# Patient Record
Sex: MALE | Race: WHITE | NOT HISPANIC OR LATINO | Employment: OTHER | URBAN - METROPOLITAN AREA
[De-identification: names, ages, dates, MRNs, and addresses within clinical notes are randomized per-mention and may not be internally consistent; named-entity substitution may affect disease eponyms.]

---

## 2017-08-01 ENCOUNTER — ALLSCRIPTS OFFICE VISIT (OUTPATIENT)
Dept: OTHER | Facility: OTHER | Age: 67
End: 2017-08-01

## 2017-08-21 ENCOUNTER — GENERIC CONVERSION - ENCOUNTER (OUTPATIENT)
Dept: OTHER | Facility: OTHER | Age: 67
End: 2017-08-21

## 2018-02-20 DIAGNOSIS — L20.9 ATOPIC DERMATITIS AND RELATED CONDITION: Primary | ICD-10-CM

## 2018-05-18 DIAGNOSIS — L20.9 ATOPIC DERMATITIS AND RELATED CONDITION: ICD-10-CM

## 2021-06-25 ENCOUNTER — TELEPHONE (OUTPATIENT)
Dept: GASTROENTEROLOGY | Facility: CLINIC | Age: 71
End: 2021-06-25

## 2021-06-25 NOTE — TELEPHONE ENCOUNTER
Aby Dueñas-  Patient will a new patient  Noemí Persaud ) maybe? He has gerd and will probably need an EGD  Noemí Persaud He is very concerned about the anesthesia that will be used for the EGD  Noemí Persaud He chose not to schedule an office appt until his questions are answered     Please phone [phone]

## 2021-06-28 NOTE — TELEPHONE ENCOUNTER
Called pt to see how we can help  Pt stated he wants twilight and to be able to drive home himself   Advised pt yes he will have twilght but due to liability issues, the hospital policy is that he would have to have a  to get him home  Suggested pt to get an uber  Pt stated he is on a fixed income and cannot do an uber  Told pt will inquire about hospital service of getting an uber  Pt said he does not want to bother the hospital about that and would feel wrong  Pt said he wanted his procedure at [facility] because every time he went to [place] something happened  Pt again stated he is not concerned about having an IV but he gets terrible nightmares from anesthesia thom the propofol  Pt again stated he wants something that he can wake up and drive home  Again advised pt of [employer] and the concern of the pt driving home  Pt said then he will die from stomach cancer  Pt also said it seems like he will contact a  because the hospital is trying to control his life     Routing to pa  Please advise

## 2021-06-28 NOTE — TELEPHONE ENCOUNTER
You are correct in what you said Carie Ramos  Unfortunately this is something we have no control over and it is a legal issue where the patient cannot drive home after any type of anesthesia, whether it is twilight or not  This will be the same policy no matter what hospital or surgery center he goes to  If he has any further concerns, I would direct him to our [address] [place] [address] Avenue

## 2022-08-09 DIAGNOSIS — Z98.84 STATUS POST BARIATRIC SURGERY: Primary | ICD-10-CM

## 2022-08-24 ENCOUNTER — HOSPITAL ENCOUNTER (OUTPATIENT)
Dept: RADIOLOGY | Facility: HOSPITAL | Age: 72
Discharge: HOME | End: 2022-08-24
Attending: SURGERY
Payer: MEDICARE

## 2022-08-24 DIAGNOSIS — Z98.84 STATUS POST BARIATRIC SURGERY: ICD-10-CM

## 2022-08-24 PROCEDURE — 74240 X-RAY XM UPR GI TRC 1CNTRST: CPT

## 2022-09-26 PROBLEM — K91.2 POSTSURGICAL MALABSORPTION: Status: ACTIVE | Noted: 2022-09-26

## 2022-09-26 PROBLEM — E11.65 TYPE 2 DIABETES MELLITUS WITH HYPERGLYCEMIA, WITH LONG-TERM CURRENT USE OF INSULIN (HCC): Status: ACTIVE | Noted: 2020-11-18

## 2022-09-26 PROBLEM — G47.30 SLEEP APNEA: Status: ACTIVE | Noted: 2017-11-09

## 2022-09-26 PROBLEM — Z79.4 TYPE 2 DIABETES MELLITUS WITH HYPERGLYCEMIA, WITH LONG-TERM CURRENT USE OF INSULIN (HCC): Status: ACTIVE | Noted: 2020-11-18

## 2022-09-26 PROBLEM — I10 HYPERTENSION, ESSENTIAL: Status: ACTIVE | Noted: 2017-11-09

## 2022-09-26 PROBLEM — I82.511 CHRONIC DEEP VEIN THROMBOSIS (DVT) OF FEMORAL VEIN OF RIGHT LOWER EXTREMITY (HCC): Status: ACTIVE | Noted: 2020-07-06

## 2022-09-26 PROBLEM — Z48.815 ENCOUNTER FOR SURGICAL AFTERCARE FOLLOWING SURGERY OF DIGESTIVE SYSTEM: Status: ACTIVE | Noted: 2022-09-26

## 2022-09-26 PROBLEM — E78.2 MIXED HYPERLIPIDEMIA: Status: ACTIVE | Noted: 2022-02-28

## 2022-09-26 NOTE — ASSESSMENT & PLAN NOTE
-At risk for malabsorption of vitamins/minerals secondary to malabsorption and restriction of intake from bariatric surgery  -NOT Currently taking adequate postop bariatric surgery vitamin supplementation: MVI, vitamin C, B12, fish oil  -Recommend start Noah MVI and calcium citrate 12-1500mg daily - to review further with RD    -Obtain CBC/Metabolic panel  -Patient received education about the importance of adhering to a lifelong supplementation regimen to avoid vitamin/mineral deficiencies

## 2022-09-26 NOTE — ASSESSMENT & PLAN NOTE
-s/p Vertical Sleeve Gastrectomy with Dr Jarred Masters in 2018  Struggling with suboptimal weight loss s/p surgery and weight regain  He does not wish to pursue revision at this time and will be referred to surgical RD and consult with MWM  He declines LCSW  He will practice 30/60 rule, avoid juice, increase protein, and avoid grazing/snacking  Recommend he eat slower, chew thoroughly, avoid drinking with meals, increase protein to avoid foaming spit up  Recommend support groups, PEP rallies  EGD to assess his esophagus and r/o Combs's  Initial: 300lbs  Current: 283 4lbs  Jett: 243lbs   Current BMI is Body mass index is 47 68 kg/m²  · Tolerating a regular diet-yes  · Eating at least 60 grams of protein per day-not always; advised he needs to increase   · Following 30/60 minute rule with liquids-yes  · Drinking at least 64 ounces of fluid per day-yes  · Drinking carbonated beverages-no  · Sufficient exercise-no d/t arthritis pain  · Using NSAIDs regularly-no  · Using nicotine-no  · Using alcohol-rare   Advised about the risks of alcohol s/p bariatric surgery and recommend avoiding all alcohol

## 2022-09-28 ENCOUNTER — OFFICE VISIT (OUTPATIENT)
Dept: BARIATRICS | Facility: CLINIC | Age: 72
End: 2022-09-28
Payer: MEDICARE

## 2022-09-28 ENCOUNTER — TELEPHONE (OUTPATIENT)
Dept: BARIATRICS | Facility: CLINIC | Age: 72
End: 2022-09-28

## 2022-09-28 VITALS
RESPIRATION RATE: 18 BRPM | WEIGHT: 283.4 LBS | BODY MASS INDEX: 47.22 KG/M2 | HEART RATE: 63 BPM | DIASTOLIC BLOOD PRESSURE: 70 MMHG | HEIGHT: 65 IN | SYSTOLIC BLOOD PRESSURE: 120 MMHG

## 2022-09-28 DIAGNOSIS — E11.65 TYPE 2 DIABETES MELLITUS WITH HYPERGLYCEMIA, WITH LONG-TERM CURRENT USE OF INSULIN (HCC): ICD-10-CM

## 2022-09-28 DIAGNOSIS — G47.30 SLEEP APNEA: ICD-10-CM

## 2022-09-28 DIAGNOSIS — K91.2 POSTSURGICAL MALABSORPTION: ICD-10-CM

## 2022-09-28 DIAGNOSIS — Z79.4 TYPE 2 DIABETES MELLITUS WITH HYPERGLYCEMIA, WITH LONG-TERM CURRENT USE OF INSULIN (HCC): ICD-10-CM

## 2022-09-28 DIAGNOSIS — Z48.815 ENCOUNTER FOR SURGICAL AFTERCARE FOLLOWING SURGERY OF DIGESTIVE SYSTEM: Primary | ICD-10-CM

## 2022-09-28 DIAGNOSIS — I82.511 CHRONIC DEEP VEIN THROMBOSIS (DVT) OF FEMORAL VEIN OF RIGHT LOWER EXTREMITY (HCC): ICD-10-CM

## 2022-09-28 DIAGNOSIS — E78.2 MIXED HYPERLIPIDEMIA: ICD-10-CM

## 2022-09-28 DIAGNOSIS — Z95.1 HX OF CABG: ICD-10-CM

## 2022-09-28 PROCEDURE — 99204 OFFICE O/P NEW MOD 45 MIN: CPT | Performed by: PHYSICIAN ASSISTANT

## 2022-09-28 RX ORDER — WARFARIN SODIUM 5 MG/1
TABLET ORAL
COMMUNITY

## 2022-09-28 RX ORDER — HYDROCHLOROTHIAZIDE 12.5 MG/1
12.5 CAPSULE, GELATIN COATED ORAL DAILY
COMMUNITY

## 2022-09-28 RX ORDER — AMITRIPTYLINE HYDROCHLORIDE 10 MG/1
10 TABLET, FILM COATED ORAL
COMMUNITY

## 2022-09-28 RX ORDER — FUROSEMIDE 40 MG/1
40 TABLET ORAL 2 TIMES DAILY
COMMUNITY

## 2022-09-28 RX ORDER — KETOCONAZOLE 20 MG/G
CREAM TOPICAL DAILY
COMMUNITY

## 2022-09-28 RX ORDER — METOPROLOL SUCCINATE 25 MG/1
25 TABLET, EXTENDED RELEASE ORAL DAILY
COMMUNITY

## 2022-09-28 RX ORDER — PRAVASTATIN SODIUM 20 MG
20 TABLET ORAL DAILY
COMMUNITY

## 2022-09-28 RX ORDER — ENALAPRIL MALEATE 10 MG/1
10 TABLET ORAL 2 TIMES DAILY
COMMUNITY

## 2022-09-28 RX ORDER — POTASSIUM CHLORIDE 20 MEQ/1
20 TABLET, EXTENDED RELEASE ORAL DAILY
COMMUNITY

## 2022-09-28 RX ORDER — GLIPIZIDE 5 MG/1
5 TABLET ORAL
COMMUNITY

## 2022-09-28 RX ORDER — AMLODIPINE BESYLATE 10 MG/1
10 TABLET ORAL DAILY
COMMUNITY

## 2022-09-28 NOTE — PROGRESS NOTES
Assessment/Plan:    Encounter for surgical aftercare following surgery of digestive system  -s/p Vertical Sleeve Gastrectomy with Dr Travis Rodriguez in 2018  Struggling with suboptimal weight loss s/p surgery and weight regain  He does not wish to pursue revision at this time and will be referred to surgical RD and consult with MWM  He declines LCSW  He will practice 30/60 rule, avoid juice, increase protein, and avoid grazing/snacking  Recommend he eat slower, chew thoroughly, avoid drinking with meals, increase protein to avoid foaming spit up  Recommend support groups, PEP rallies  EGD to assess his esophagus and r/o Combs's  Initial: 300lbs  Current: 283 4lbs  Jett: 243lbs   Current BMI is Body mass index is 47 68 kg/m²  · Tolerating a regular diet-yes  · Eating at least 60 grams of protein per day-not always; advised he needs to increase   · Following 30/60 minute rule with liquids-yes  · Drinking at least 64 ounces of fluid per day-yes  · Drinking carbonated beverages-no  · Sufficient exercise-no d/t arthritis pain  · Using NSAIDs regularly-no  · Using nicotine-no  · Using alcohol-rare   Advised about the risks of alcohol s/p bariatric surgery and recommend avoiding all alcohol      Postsurgical malabsorption  -At risk for malabsorption of vitamins/minerals secondary to malabsorption and restriction of intake from bariatric surgery  -NOT Currently taking adequate postop bariatric surgery vitamin supplementation: MVI, vitamin C, B12, fish oil  -Recommend start Noah MVI and calcium citrate 12-1500mg daily - to review further with RD    -Obtain CBC/Metabolic panel  -Patient received education about the importance of adhering to a lifelong supplementation regimen to avoid vitamin/mineral deficiencies       Type 2 diabetes mellitus with hyperglycemia, with long-term current use of insulin (HCC)    No results found for: HGBA1C   -HgbA1C ordered  -Healthy diet and exercise changes as able  -F/u with RD    Sleep apnea  -Wearing CPAP  -Encouraged consistent use of CPAP and follow up with sleep medicine as directed       Chronic deep vein thrombosis (DVT) of femoral vein of right lower extremity (HCC)  -Hx of chronic DVT in R leg  -On coumadin   -Followed by Hematology     Hx of CABG  -Hx of CABG x4 in 2012    Mixed hyperlipidemia  -Avoid fried foods and trans fat, limit saturated fats and refined carbohydrates  -Increase fish/omega 3 FA consumption  -Increase physical activity  -Lipid panel ordered       Diagnoses and all orders for this visit:    Encounter for surgical aftercare following surgery of digestive system    Postsurgical malabsorption  -     CBC and differential; Future  -     Comprehensive metabolic panel; Future  -     Folate; Future  -     Hemoglobin A1C; Future  -     Iron Panel (Includes Ferritin, Iron Sat%, Iron, and TIBC); Future  -     Zinc; Future  -     Vitamin D 25 hydroxy; Future  -     Vitamin B12; Future  -     Vitamin B1, whole blood; Future  -     Vitamin A; Future  -     PTH, intact; Future  -     Lipid panel; Future    Type 2 diabetes mellitus with hyperglycemia, with long-term current use of insulin (HCC)  -     Hemoglobin A1C; Future    Sleep apnea    Chronic deep vein thrombosis (DVT) of femoral vein of right lower extremity (HCC)    Hx of CABG    Mixed hyperlipidemia  -     Lipid panel; Future    Other orders  -     hydrochlorothiazide (MICROZIDE) 12 5 mg capsule; Take 12 5 mg by mouth daily  -     furosemide (LASIX) 40 mg tablet; Take 40 mg by mouth 2 (two) times a day  -     amLODIPine (NORVASC) 10 mg tablet; Take 10 mg by mouth daily  -     enalapril (VASOTEC) 10 mg tablet; Take 10 mg by mouth 2 (two) times a day  -     metFORMIN (GLUCOPHAGE) 1000 MG tablet; Take 1,000 mg by mouth 2 (two) times a day with meals  -     glipiZIDE (GLUCOTROL) 5 mg tablet; Take 5 mg by mouth 2 (two) times a day before meals  -     warfarin (COUMADIN) 5 mg tablet;  Take by mouth daily  -     metoprolol succinate (TOPROL-XL) 25 mg 24 hr tablet; Take 25 mg by mouth daily  -     pravastatin (PRAVACHOL) 20 mg tablet; Take 20 mg by mouth daily  -     potassium chloride (K-DUR,KLOR-CON) 20 mEq tablet; Take 20 mEq by mouth daily  -     amitriptyline (ELAVIL) 10 mg tablet; Take 10 mg by mouth daily at bedtime  -     ketoconazole (NIZORAL) 2 % cream; Apply topically daily          Subjective:      Patient ID: Johana Patton is a 73 y o  male  -s/p Vertical Sleeve Gastrectomy with Dr Risa Barillas in 2018  Presents to the office today to establish care  He was not happy with Dr Ally Santos office and does not feel he got adequate support  He lost about 30lbs prior to surgery and then lost an additional 30lbs s/p surgery  He regurgitates foam about 3x/week and has occasional reflux that he takes prn TUMS to relieve  He notes his reflux is better now than before surgery  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[place] [address]  He does not wish to have revision surgery and wants to get back on track with [facility]  UGI 08/24/22: "Expected postoperative changes from sleeve gastrectomy, Multiple episodes of mild gastroesophageal reflux "     Diet Recall:   B - 6oz fruit juice and 16oz coffee w/ half/half and sweet and low  10am - 1/2 sandwich w/ butter and ham or turkey or chicken salad and tomato and peppers and rye or white bread  3pm - popcorn or small salad green and veggies  D - chicken or fish or burger or steak and     Fluids - 6oz juice, 16oz coffee, unsweet iced tea 80oz, rare bloody rebecca    The following portions of the patient's history were reviewed and updated as appropriate: allergies, current medications, past family history, past medical history, past social history, past surgical history and problem list     Review of Systems   Constitutional: Positive for unexpected weight change (weight regain)  Negative for chills and fever  HENT: Negative for trouble swallowing      Respiratory: Negative for cough and shortness of breath  Cardiovascular: Negative for chest pain and palpitations  Gastrointestinal: Positive for vomiting  Negative for abdominal pain, constipation, diarrhea and nausea  +reflux   Musculoskeletal: Positive for arthralgias and back pain  Neurological: Negative for dizziness  Psychiatric/Behavioral:        Denies anxiety and depression         Objective:      /70 (BP Location: Left arm, Patient Position: Sitting, Cuff Size: Large)   Pulse 63   Resp 18   Ht 5' 4 65" (1 642 m)   Wt 129 kg (283 lb 6 4 oz)   BMI 47 68 kg/m²     Colonoscopy-Completed       Physical Exam  Vitals reviewed  Constitutional:       General: He is not in acute distress  Appearance: He is well-developed  He is obese  HENT:      Head: Normocephalic and atraumatic  Eyes:      General: No scleral icterus  Cardiovascular:      Rate and Rhythm: Normal rate and regular rhythm  Pulmonary:      Effort: Pulmonary effort is normal  No respiratory distress  Abdominal:      General: There is no distension  Palpations: Abdomen is soft  Tenderness: There is no abdominal tenderness  Comments: No incisional hernias appreciated   Skin:     General: Skin is warm and dry  Neurological:      Mental Status: He is alert and oriented to person, place, and time  Psychiatric:         Mood and Affect: Mood normal          Behavior: Behavior normal            BARRIERS: none identified    GOALS:   · Continued/Maintain healthy weight loss with good nutrition intakes  · Adequate hydration with at least 64oz  fluid intake  · Normal vitamin and mineral levels  · Exercise as tolerated  · Follow-up in 1 year  We kindly ask that your arrive 15 minutes before your scheduled appointment time with your provider to allow our staff to room you, get your vital signs and update your chart  · Follow diet as discussed  · Get lab work done in the next 2 weeks  You have been given a lab slip today    Please call the office if you need a replacement  It is recommended to check with your insurance BEFORE getting labs done to make sure they are covered by your policy  Also, please check with your PCP and other providers before getting labs to avoid duplicate labs  Make sure to HOLD any multivitamins that may contain biotin and any biotin supplements FOR 5 DAYS before any labs since it can affect the results  · Follow vitamin and mineral recommendations as reviewed with you  · Call our office if you have any problems with abdominal pain especially associated with fever, chills, nausea, vomiting or any other concerns  · All  Post-bariatric surgery patients should be aware that very small quantities of any alcohol can cause impairment and it is very possible not to feel the effect  The effect can be in the system for several hours  It is also a stomach irritant  · It is advised to AVOID alcohol, Nonsteroidal antiinflammatory drugs (NSAIDS) and nicotine of all forms   Any of these can cause stomach irritation/pain

## 2022-09-28 NOTE — TELEPHONE ENCOUNTER
Lm on pt vm  Eduardo indicated pt needs to have an EGD  If pt is not up to date on his colonoscopies, he may want to contact his GI Dr and have both done at the same time, otherwise, we can just schedule him for an EGD  Asked pt in the vm to give us a call back to schedule

## 2022-09-30 ENCOUNTER — TELEPHONE (OUTPATIENT)
Dept: BARIATRICS | Facility: CLINIC | Age: 72
End: 2022-09-30

## 2022-09-30 NOTE — TELEPHONE ENCOUNTER
I received a call from the patient today who was very upset about our process  He doesn't want to do the EGD or Colonoscopy and feels the bloodwork is centering around his diabetes which is none of our business  I did explain that while the procedures were ordered, it was was entirely his decision as to whether he would have them done or not  I did try to explain that the bloodwork is necessary to make sure that all levels, including vitamins etc are at proper levels but that I could have Desirae Soto or someone clinical give him a call to discuss that with him since they are more well versed in those topics  But he was not wanting the call  He initially(at the start of the call, before the conversation really started) wanted to just cancel all appointment, but after speaking with me, will think things over this weekend and will call back next week to decide how he wants to proceed  He may decide to come in to talk to Desirae Soto face to face

## 2022-12-06 ENCOUNTER — CONSULT (OUTPATIENT)
Dept: BARIATRICS | Facility: CLINIC | Age: 72
End: 2022-12-06

## 2022-12-06 VITALS
WEIGHT: 277.6 LBS | SYSTOLIC BLOOD PRESSURE: 140 MMHG | RESPIRATION RATE: 16 BRPM | DIASTOLIC BLOOD PRESSURE: 80 MMHG | HEIGHT: 65 IN | HEART RATE: 61 BPM | BODY MASS INDEX: 46.25 KG/M2

## 2022-12-06 DIAGNOSIS — E66.01 OBESITY, CLASS III, BMI 40-49.9 (MORBID OBESITY) (HCC): ICD-10-CM

## 2022-12-06 DIAGNOSIS — Z90.3 H/O GASTRIC SLEEVE: ICD-10-CM

## 2022-12-06 DIAGNOSIS — E11.65 TYPE 2 DIABETES MELLITUS WITH HYPERGLYCEMIA, WITH LONG-TERM CURRENT USE OF INSULIN (HCC): ICD-10-CM

## 2022-12-06 DIAGNOSIS — Z95.1 HX OF CABG: ICD-10-CM

## 2022-12-06 DIAGNOSIS — G47.30 SLEEP APNEA: ICD-10-CM

## 2022-12-06 DIAGNOSIS — Z79.4 TYPE 2 DIABETES MELLITUS WITH HYPERGLYCEMIA, WITH LONG-TERM CURRENT USE OF INSULIN (HCC): ICD-10-CM

## 2022-12-06 DIAGNOSIS — K91.2 POSTSURGICAL MALABSORPTION: ICD-10-CM

## 2022-12-06 DIAGNOSIS — E66.01 MORBID OBESITY (HCC): Primary | ICD-10-CM

## 2022-12-06 RX ORDER — FENOFIBRATE 150 MG/1
CAPSULE ORAL
COMMUNITY

## 2022-12-06 RX ORDER — AMLODIPINE BESYLATE 10 MG/1
10 TABLET ORAL DAILY
COMMUNITY
Start: 2022-09-12

## 2022-12-06 RX ORDER — HYDROCHLOROTHIAZIDE 12.5 MG/1
12.5 TABLET ORAL DAILY
COMMUNITY
Start: 2022-11-09

## 2022-12-06 RX ORDER — KETOCONAZOLE 20 MG/G
CREAM TOPICAL
COMMUNITY
End: 2022-12-06

## 2022-12-06 RX ORDER — PRAVASTATIN SODIUM 20 MG
TABLET ORAL
COMMUNITY

## 2022-12-06 RX ORDER — NYSTATIN 100000 U/G
CREAM TOPICAL 2 TIMES DAILY PRN
COMMUNITY
Start: 2022-07-06

## 2022-12-06 RX ORDER — ENALAPRIL MALEATE 10 MG/1
1 TABLET ORAL 2 TIMES DAILY
COMMUNITY
Start: 2022-09-06

## 2022-12-06 RX ORDER — DESOXIMETASONE 2.5 MG/G
CREAM TOPICAL
COMMUNITY

## 2022-12-06 RX ORDER — FUROSEMIDE 40 MG/1
40 TABLET ORAL 2 TIMES DAILY
COMMUNITY
Start: 2022-08-17

## 2022-12-06 RX ORDER — POTASSIUM CHLORIDE 1.5 G/1.77G
POWDER, FOR SOLUTION ORAL
COMMUNITY

## 2022-12-06 RX ORDER — MAGNESIUM OXIDE 400 MG/1
1 TABLET ORAL DAILY
COMMUNITY
Start: 2022-09-12

## 2022-12-06 RX ORDER — METOPROLOL SUCCINATE 50 MG/1
50 TABLET, EXTENDED RELEASE ORAL DAILY
COMMUNITY
Start: 2022-09-04

## 2022-12-06 RX ORDER — AMITRIPTYLINE HYDROCHLORIDE 10 MG/1
TABLET, FILM COATED ORAL
COMMUNITY

## 2022-12-06 RX ORDER — TRIAMCINOLONE ACETONIDE 5 MG/G
1 CREAM TOPICAL 2 TIMES DAILY
COMMUNITY
Start: 2022-10-19

## 2022-12-06 RX ORDER — GLIPIZIDE 10 MG/1
TABLET ORAL
COMMUNITY

## 2022-12-06 RX ORDER — PANTOPRAZOLE SODIUM 40 MG/1
40 TABLET, DELAYED RELEASE ORAL DAILY
COMMUNITY
Start: 2022-09-29

## 2022-12-06 NOTE — PROGRESS NOTES
Assessment/Plan:  Velia Duong was seen today for consult  Diagnoses and all orders for this visit:    Morbid obesity ([facility] )  Obesity, Class III, BMI 40-49 9 (morbid obesity) ([facility] )  Reviewed providers notes  Patient does not want to follow surgical revision path or meet with dietician due to financial reasons  He also does not want to do blood work ordered by surgical team because his primary care and vascular specialist is looking into his medical conditions and feels that it is too much for him to go and repeat some blood tests  Weight not at goal  Nutrition prescription:  Calorie goal: 1400kcal - menu with 1200kcal plan and snacks given  Protein: 60-70g lists with protein sources given  Encourage mindful eating, portion control, motivational interview performed to help patient reach goals   Hx of CABG  Not a candidate for Phentermine, Wellbutrin at this point  On Coumadin- avoid green vegetables discussed potential alternatives in his diet   H/O gastric sleeve  Advised reach protein goal at least 60 g daily  Unkown renal fc  Sleep apnea  Advised to cont using CPAP  Type 2 diabetes mellitus with hyperglycemia, with long-term current use of insulin ([facility] )  uncontrolled he tells me HbA1C 9  Advised him on using GLP-1 gave name for Ozempic and Rybelsus as examples  He recalls had some in his past but were too expensive to maintain treatment  Advised him to see an endocrinologist to help with diabetes management    Total time spent:40  minutes with >50%  face-to-face time spent counseling patient on nonsurgical interventions for the treatment of excess weight  Discussed the role of weight loss medications  Counseled patient on diet behavior and exercise modification for weight loss      Follow up : he will call if decides to follow up      Subjective:   Chief Complaint   Patient presents with   • Consult     Initial visit with robert     Patient here to discuss weight associated problems and nutrition goals  HPI: Catracho Benitez  is a 81 y o  male with excess weight/obesity here to pursue weight management  -s/p Vertical Sleeve Gastrectomy with Dr Greg Olson in 2018  Struggling with suboptimal weight loss s/p surgery and weight regain  He does not wish to pursue revision at this time  Most recent notes and records were reviewed  Initial weight loss goal of 5-10% weight loss for improved health  Wt Readings from Last 10 Encounters:   12/06/22 126 kg (277 lb 9 6 oz)   09/28/22 129 kg (283 lb 6 4 oz)       Initial: 300lbs  Last visit: 283 4lbs  Jett: 243lbs   Current weight 277lbs  Current BMI is Body mass index is 47 68 kg/m²  · Tolerating a regular diet-yes  · Eating at least 60 grams of protein per day-not always; advised he needs to increase   · Following 30/60 minute rule with liquids-yes  · Drinking at least 64 ounces of fluid per day-yes  · Drinking carbonated beverages-no  · Sufficient exercise-no d/t arthritis pain  · Using NSAIDs regularly-no  · Using nicotine-no  · Using alcohol-rare  Not wanting to discuss diet anymore wants to do everything   B:sandwich ham and roasted red pepper   L: skips  D: smoke meat and egg   Snacks:   Exercise: has knee arthritis  Hydration:coffee in am and some tea, not much water  Alcohol: rarely   Uses CPAP        The following portions of the patient's history were reviewed and updated as appropriate: allergies, current medications, past family history, past medical history, past social history, past surgical history, and problem list       Review of Systems   Constitutional: Negative for activity change  Fatigue  HENT: Negative for trouble swallowing  Respiratory: Negative for shortness of breath      Cardiovascular: Negative for chest pain,++ edema  Gastrointestinal: Negative for abdominal pain, nausea and vomiting, acid reflux, constipation/diarrhea  Psychiatric/Behavioral: Negative for behavioral problems , anxiety or depression    Objective:  /80 (BP Location: Left arm, Patient Position: Sitting, Cuff Size: Large)   Pulse 61   Resp 16   Ht 5' 4 6" (1 641 m)   Wt 126 kg (277 lb 9 6 oz)   BMI 46 77 kg/m²   Constitutional: Well-developed, well-nourished and Obese Body mass index is 46 77 kg/m²  Gerhardt Sep HEENT: No conjunctival injection  No thyroid masses  Pulmonary: No increased work of breathing or signs of respiratory distress  Clear respiratory sounds  CV: Well-perfused, Regular rate and rhythm, no murmurs, +edema  GI: increased abdominal girth  Non-distended  Not tender   Neuro: Oriented to person, place and time  Normal Speech  Normal gait  Psych: Normal affect and mood   No delusion or hallucinations, normal thought process

## 2024-07-02 ENCOUNTER — OFFICE VISIT (OUTPATIENT)
Age: 74
End: 2024-07-02
Payer: MEDICARE

## 2024-07-02 VITALS — WEIGHT: 283 LBS | TEMPERATURE: 97.5 F | BODY MASS INDEX: 45.48 KG/M2 | HEIGHT: 66 IN

## 2024-07-02 DIAGNOSIS — Z13.89 SCREENING FOR SKIN CONDITION: Primary | ICD-10-CM

## 2024-07-02 DIAGNOSIS — D18.01 CHERRY ANGIOMA: ICD-10-CM

## 2024-07-02 DIAGNOSIS — D22.9 NEVUS: ICD-10-CM

## 2024-07-02 DIAGNOSIS — L57.0 ACTINIC KERATOSIS: ICD-10-CM

## 2024-07-02 DIAGNOSIS — L82.1 SEBORRHEIC KERATOSIS: ICD-10-CM

## 2024-07-02 PROCEDURE — 99213 OFFICE O/P EST LOW 20 MIN: CPT | Performed by: DERMATOLOGY

## 2024-07-02 PROCEDURE — 17000 DESTRUCT PREMALG LESION: CPT | Performed by: DERMATOLOGY

## 2024-07-02 PROCEDURE — 17003 DESTRUCT PREMALG LES 2-14: CPT | Performed by: DERMATOLOGY

## 2024-07-02 NOTE — PATIENT INSTRUCTIONS
"ACTINIC KERATOSIS    Actinic keratoses are very common on sites repeatedly exposed to the sun, especially the backs of the hands and the face.  They are considered precancers and have a low risk of turning into squamous cell carcinoma. It is rare for a solitary actinic keratosis to evolve into a squamous cell carcinoma (SCC), but the risk is 10-15% when more than 10 actinic keratoses are present. A tender, thickened, ulcerated or enlarging actinic keratosis is suspicious of SCC.    Actinic keratoses may be prevented by strict sun protection. If already present, keratoses may improve with a very high sun protection factor (50+) broad-spectrum sunscreen applied at least daily to affected areas, year-round.  We recommend that sun protective clothing and hats and sunglasses be worn whenever possible.  Note that you can make you own UPF 30 rate clothing using just your own washing machine with a product called sun guard    There are several different options for treating actinic keratoses    Topical “medications such as 5-fluorouracil or Aldara  - good for field treatment ie treats what's seen and not seen    Cryotherapy - good for single spots but treats “only what we see” versus a field treatment    Photodynamic therapy - involves application of a light sensitizing medicine and then exposure to a special light, also a good field treatment      Treatment with Cryotherapy    The doctor has treated your skin with nitrogen, which is 320 degrees Fahrenheit below zero.  He has given the treated area \"frostbite.\"    Stinging should subside within a few hours.  You can take Tylenol for pain, if needed.    Over the next few days, it is normal if the area becomes reddened, a blood blister, or swollen with fluid.  If the lesion treated was near the eye - you could get a swollen eye over the next few days.  Do not panic!  This is all temporary, and will resolve with time.    There is no special treatment - just keep the area " "clean.  Makeup and BandAids can be used, if preferred.    When the area starts to dry up and peel off, using Vaseline can help healing.    It usually takes up to a month for it to heal.  Some lesions are recurrent and may require repeat treatments.  If a lesion has not healed in one month, please don't hesitate to contact us.      If you have any further questions that are not answered here, please call us.  [phone].    Thank you for allowing us to care for you.     MELANOCYTIC NEVI (\"Moles\")    Melanocytic nevi (\"moles\") are tan or brown, raised or flat areas of the skin which have an increased number of melanocytes. Melanocytes are the cells in our body which make pigment and account for skin color.    Some moles are present at birth (I.e., \"congenital nevi\"), while others come up later in life (i.e., \"acquired nevi\").  The sun can stimulate the body to make more moles.  Sunburns are not the only thing that triggers more moles.  Chronic sun exposure can do it too.     Clinically distinguishing a healthy mole from melanoma may be difficult, even for experienced dermatologists. The \"ABCDE's\" of moles have been suggested as a means of helping to alert a person to a suspicious mole and the possible increased risk of melanoma.  The suggestions for raising alert are as follows:    Asymmetry: Healthy moles tend to be symmetric, while melanomas are often asymmetric.  Asymmetry means if you draw a line through the mole, the two halves do not match in color, size, shape, or surface texture. Asymmetry can be a result of rapid enlargement of a mole, the development of a raised area on a previously flat lesion, scaling, ulceration, bleeding or scabbing within the mole.  Any mole that starts to demonstrate \"asymmetry\" should be examined promptly by a board certified dermatologist.     Border: Healthy moles tend to have discrete, even borders.  The border of a melanoma often blends into the normal skin and does not sharply " "delineate the mole from normal skin.  Any mole that starts to demonstrate \"uneven borders\" should be examined promptly by a board certified dermatologist.     Color: Healthy moles tend to be one color throughout.  Melanomas tend to be made up of different colors ranging from dark black, blue, white, or red.  Any mole that demonstrates a color change should be examined promptly by a board certified dermatologist.     Diameter: Healthy moles tend to be smaller than 0.6 cm in size; an exception are \"congenital nevi\" that can be larger.  Melanomas tend to grow and can often be greater than 0.6 cm (1/4 of an inch, or the size of a pencil eraser). This is only a guideline, and many normal moles may be larger than 0.6 cm without being unhealthy.  Any mole that starts to change in size (small to bigger or bigger to smaller) should be examined promptly by a board certified dermatologist.     Evolving: Healthy moles tend to \"stay the same.\"  Melanomas may often show signs of change or evolution such as a change in size, shape, color, or elevation.  Any mole that starts to itch, bleed, crust, burn, hurt, or ulcerate or demonstrate a change or evolution should be examined promptly by a board certified dermatologist.      Dysplastic Nevi  Dysplastic moles are moles that fit the ABCDE rules of melanoma but are not identified as melanomas when examined under the microscope.  They may indicate an increased risk of melanoma in that person. If there is a family history of melanoma, most experts agree that the person may be at an increased risk for developing a melanoma.  Experts still do not agree on what dysplastic moles mean in patients without a personal or family history of melanoma.  Dysplastic moles are usually larger than common moles and have different colors within it with irregular borders. The appearance can be very similar to a melanoma. Biopsies of dysplastic moles may show abnormalities which are different from a " "regular mole.      Melanoma  Malignant melanoma is a type of skin cancer that can be deadly if it spreads throughout the body. The incidence of melanoma in the [place] is growing faster than any other cancer. Melanoma usually grows near the surface of the skin for a period of time, and then begins to grow deeper into the skin. Once it grows deeper into the skin, the risk of spread to other organs greatly increases. Therefore, early detection and removal of a malignant melanoma may result in a better chance at a complete cure; removal after the tumor has spread may not be as effective, leading to worse clinical outcomes such as death.    The true rate of nevus transformation into a melanoma is unknown. It has been estimated that the lifetime risk for any acquired melanocytic nevus on any 20-year-old individual transforming into melanoma by age 80 is 0.03% (1 in 3,164) for men and 0.009% (1 in 10,800) for women.     The appearance of a \"new mole\" remains one of the most reliable methods for identifying a malignant melanoma.  Occasionally, melanomas appear as rapidly growing, blue-black, dome-shaped bumps within a previous mole or previous area of normal skin.  Other times, melanomas are suspected when a mole suddenly appears or changes. Itching, burning, or pain in a pigmented lesion should increase suspicion, but most patients with early melanoma have no skin discomfort whatsoever.  Melanoma can occur anywhere on the skin, including areas that are difficult for self-examination. Many melanomas are first noticed by other family members.  Suspicious-looking moles may be removed for microscopic examination.       You may be able to prevent death from melanoma by doing two simple things:    Try to avoid unnecessary sun exposure and protect your skin when it is exposed to the sun.  People who live near the equator, people who have intermittent exposures to large amounts of sun, and people who have had sunburns in " "childhood or adolescence have an increased risk for melanoma. Sun sense and vigilant sun protection may be keys to helping to prevent melanoma.  We recommend wearing UPF-rated sun protective clothing and sunglasses whenever possible and applying a moisturizer-sunscreen combination product (SPF 50+) such as Neutrogena Daily Defense to sun exposed areas of skin at least three times a day.    Have your moles regularly examined by a board certified dermatologist AND by yourself or a family member/friend at home.  We recommend that you have your moles examined at least once a year by a board certified dermatologist.  Use your birthday as an annual reminder to have your \"Birthday Suit\" (I.e., your skin) examined; it is a nice birthday gift to yourself to know that your skin is healthy appearing!  Additionally, at-home self examinations may be helpful for detecting a possible melanoma.  Use the ABCDEs we discussed and check your moles once a month at home.        SEBORRHEIC KERATOSIS    A seborrheic keratosis is a harmless warty spot that appears during adult life as a common sign of skin aging.  Seborrheic keratoses can arise on any area of skin, covered or uncovered, with the usual exception of the palms and soles. They do not arise from mucous membranes. Seborrheic keratoses can have highly variable appearance.      Seborrheic keratoses are extremely common. It has been estimated that over 90% of adults over the age of 60 years have one or more of them. They occur in males and females of all races, typically beginning to erupt in the 30s or 40s. They are uncommon under the age of 20 years.  The precise cause of seborrhoeic keratoses is not known.  Seborrhoeic keratoses are considered degenerative in nature. As time goes by, seborrheic keratoses tend to become more numerous. Some people inherit a tendency to develop a very large number of them; some people may have hundreds of them.    The name \"seborrheic keratosis\" is " "misleading, because these lesions are not limited to a seborrhoeic distribution (scalp, mid-face, chest, upper back), nor are they formed from sebaceous glands, nor are they associated with sebum -- which is greasy.  Seborrheic keratosis may also be called \"SK,\" \"Seb K,\" \"basal cell papilloma,\" \"senile wart,\" or \"barnacle.\"      There is no easy way to remove multiple lesions on a single occasion.  Unless a specific lesion is \"inflamed\" and is causing pain or stinging/burning or is bleeding, most insurance companies do not authorize treatment.      ANGIOMA (\"CHERRY ANGIOMA\")    Constantino angiomas markedly increase in number from about the age of 40, so it has been estimated that 75% of people over 75 years of age have them. Although they also called \"senile angiomas,\" they can occur in young people too - 5% of adolescents have been found to have them.     Cherry angiomas are very common in males and females of any age or race, with no difference in sexes or races affected. They are however more noticeable in white skin than in skin of colour.  There may be a family history of similar lesions. Eruptive (very large number appearing in a short period of time) cherry angiomas have been rarely reported to be associated with internal malignancy and pregnancy.   "

## 2024-07-02 NOTE — PROGRESS NOTES
"[facility] Note     Patient Name: Bret Urbina  Encounter Date: 07/02/2024     Have you been cared for by a [facility] Dermatologist in the last 3 years and, if so, which description applies to you?    NO.   I am considered a \"new\" patient and must complete all patient intake questions. I am MALE/not capable of bearing children.    REVIEW OF SYSTEMS:  Have you recently had or currently have any of the following? Recent fever or chills? No  Any non-healing wound? No   PAST MEDICAL HISTORY:  Have you personally ever had or currently have any of the following?  If \"YES,\" then please provide more detail. Skin cancer (such as Melanoma, Basal Cell Carcinoma, Squamous Cell Carcinoma?  No  Tuberculosis, HIV/AIDS, Hepatitis B or C: No  Radiation Treatment No   HISTORY OF IMMUNOSUPPRESSION:   Do you have a history of any of the following:  Systemic Immunosuppression such as Diabetes, Biologic or Immunotherapy, Chemotherapy, Organ Transplantation, Bone Marrow Transplantation?  No     Answering \"YES\" requires the addition of the dotphrase \"IMMUNOSUPPRESSED\" as the first diagnosis of the patient's visit.   FAMILY HISTORY:  Any \"first degree relatives\" (parent, brother, sister, or child) with the following?    Skin Cancer, Pancreatic or Other Cancer? No   PATIENT EXPERIENCE:    Do you want the Dermatologist to perform a COMPLETE skin exam today including a clinical examination under the \"bra and underwear\" areas?  Yes  If necessary, do we have your permission to call and leave a detailed message on your Preferred Phone number that includes your specific medical information?  Yes      Allergies   Allergen Reactions    Erythromycin Vomiting    Latex Itching    Zinc Acetate Vomiting    Dog Epithelium Sneezing    Dust Mite Extract Sneezing    Pollen Extract Sneezing    Wound Dressing Adhesive Rash      Current Outpatient Medications:     amitriptyline (ELAVIL) 10 mg tablet, Take 10 mg by mouth daily at bedtime, " Disp: , Rfl:     amitriptyline (ELAVIL) 10 mg tablet, Take by mouth (Patient not taking: Reported on 12/6/2022), Disp: , Rfl:     amLODIPine (NORVASC) 10 mg tablet, Take 10 mg by mouth daily, Disp: , Rfl:     amLODIPine (NORVASC) 10 mg tablet, Take 10 mg by mouth daily (Patient not taking: Reported on 12/6/2022), Disp: , Rfl:     desoximetasone (TOPICORT) 0.25 % cream, Apply topically, Disp: , Rfl:     enalapril (VASOTEC) 10 mg tablet, Take 10 mg by mouth 2 (two) times a day, Disp: , Rfl:     enalapril (VASOTEC) 10 mg tablet, Take 1 tablet by mouth 2 (two) times a day (Patient not taking: Reported on 12/6/2022), Disp: , Rfl:     Fenofibrate 150 MG CAPS, Take by mouth, Disp: , Rfl:     furosemide (LASIX) 40 mg tablet, Take 40 mg by mouth 2 (two) times a day (Patient not taking: Reported on 12/6/2022), Disp: , Rfl:     furosemide (LASIX) 40 mg tablet, Take 40 mg by mouth 2 (two) times a day, Disp: , Rfl:     glipiZIDE (GLUCOTROL) 10 mg tablet, Take by mouth, Disp: , Rfl:     hydrochlorothiazide (HYDRODIURIL) 12.5 mg tablet, Take 12.5 mg by mouth daily, Disp: , Rfl:     ketoconazole (NIZORAL) 2 % cream, Apply topically daily, Disp: , Rfl:     magnesium oxide (MAG-OX) 400 mg tablet, Take 1 tablet by mouth daily, Disp: , Rfl:     metFORMIN (GLUCOPHAGE) 1000 MG tablet, Take 1,000 mg by mouth 2 (two) times a day with meals, Disp: , Rfl:     metFORMIN (GLUCOPHAGE) 1000 MG tablet, Take by mouth (Patient not taking: Reported on 12/6/2022), Disp: , Rfl:     metoprolol succinate (TOPROL-XL) 25 mg 24 hr tablet, Take 25 mg by mouth daily (Patient not taking: Reported on 12/6/2022), Disp: , Rfl:     metoprolol succinate (TOPROL-XL) 50 mg 24 hr tablet, Take 50 mg by mouth daily, Disp: , Rfl:     metoprolol tartrate (LOPRESSOR) 25 mg tablet, Take by mouth (Patient not taking: Reported on 12/6/2022), Disp: , Rfl:     nystatin (MYCOSTATIN) cream, Apply topically 2 (two) times a day as needed, Disp: , Rfl:     pantoprazole (PROTONIX)  40 mg tablet, Take 40 mg by mouth daily, Disp: , Rfl:     potassium chloride (K-DUR,KLOR-CON) 20 mEq tablet, Take 20 mEq by mouth daily (Patient not taking: Reported on 12/6/2022), Disp: , Rfl:     potassium chloride (KLOR-CON) 20 mEq packet, Take by mouth (Patient not taking: Reported on 12/6/2022), Disp: , Rfl:     pravastatin (PRAVACHOL) 20 mg tablet, Take 20 mg by mouth daily, Disp: , Rfl:     pravastatin (PRAVACHOL) 20 mg tablet, Take by mouth (Patient not taking: Reported on 12/6/2022), Disp: , Rfl:     triamcinolone (KENALOG) 0.1 % ointment, APPLY TO AFFECTED AREA TWICE A DAY ARMS ** MD STATED OINTMENT ONLY (Patient not taking: Reported on 12/6/2022), Disp: 30 g, Rfl: 0    triamcinolone (KENALOG) 0.5 % cream, Apply 1 application topically 2 (two) times a day To affected area, Disp: , Rfl:     warfarin (COUMADIN) 5 mg tablet, Take by mouth daily, Disp: , Rfl:           Whom besides the patient is providing clinical information about today's encounter?   NO ADDITIONAL HISTORIAN (patient alone provided history)      73 year old male new patient present for overall skin exam. Patient has some spot of concern on his scalp.     Physical Exam and Assessment/Plan by Diagnosis:    ACTINIC KERATOSIS    Physical Exam:  Anatomic Location: scalp and right forearm  Morphologic Description: Scaly pink papules  Physical Exam  Constitutional:        HENT:      Head:              Plan:  Cryotherapy performed in the office (See Procedure Note). We discussed that a hypopigmentation or scar may form in the region following cryotherapy.  We discussed the pre-cancerous nature of the condition. Actinic keratosis is found on sun-damaged skin and there is small risk that the condition could turn into a skin cancer called squamous cell carcinoma. There is no risk of actinic keratosis turning into melanoma.  We discussed sun protection measures, including using sunscreen with an SPF 50+ year round, avoiding the sun, and wearing  "protective clothing such as long sleeves and pants when out in the sun.  Continue to monitor clinically for signs of recurrence. Discussed with patient the importance of keeping up to date with full body skin exams.     PROCEDURES PERFORMED TODAY ASSOCIATED WITH THIS CONDITION:          Cryotherapy: PROCEDURE:  DESTRUCTION OF PRE-MALIGNANT LESIONS  After a thorough discussion of treatment options and risk/benefits/alternatives (including but not limited to local pain, scarring, dyspigmentation, blistering, and possible superinfection), verbal and written consent were obtained and the aforementioned lesions were treated on with cryotherapy using liquid nitrogen x 1 cycle for 5-10 seconds.    TOTAL NUMBER of 5 pre-malignant lesions were treated today on the ANATOMIC LOCATION: scalp and right forearm.     The patient tolerated the procedure well, and after-care instructions were provided.          MELANOCYTIC NEVI (\"Moles\")    Physical Exam:  Anatomic Location Affected: Mostly on sun-exposed areas of the body  Morphological Description:  Scattered, 1-4mm round to ovoid, symmetrical-appearing, even bordered, skin colored to dark brown macules/papules, mostly in sun-exposed areas    Additional History of Present Condition:  present on exam.     Assessment and Plan:  Based on a thorough discussion of this condition and the management approach to it (including a comprehensive discussion of the known risks, side effects and potential benefits of treatment), the patient (family) agrees to implement the following specific plan:  Provided handout with information regarding the ABCDE's of moles   Recommend routine skin exams every year   Sun avoidance, protective clothing (known as UPF clothing), and the use of at least SPF 30 sunscreens is advised. Sunscreen should be reapplied every two hours when outside.       SEBORRHEIC KERATOSIS; NON-INFLAMED    Physical Exam:  Anatomic Location Affected:  scattered across trunk, " "extremities,  face  Morphological Description:  Flat and raised, waxy, smooth to warty textured, yellow to brownish-grey to dark brown to blackish, discrete, \"stuck-on\" appearing papules.    Additional History of Present Condition:  Patient reports new bumps on the skin.  Denies itch, burn, pain, bleeding or ulceration.  Present constantly; nothing seems to make it worse or better.  No prior treatment.      Assessment and Plan:  Based on a thorough discussion of this condition and the management approach to it (including a comprehensive discussion of the known risks, side effects and potential benefits of treatment), the patient (family) agrees to implement the following specific plan:  Reassured benign        ANGIOMA (\"CHERRY ANGIOMA\")    Physical Exam:  Anatomic Location: scattered across sun exposed areas of the trunk and extremities   Morphologic Description: Firm red to reddish-blue discrete papules    Additional History of Present Condition:  Present on exam.     Assessment and Plan:  Reassured benign      Scribe Attestation      I,:  Jil Edgar am acting as a scribe while in the presence of the attending physician.:       I,:  Flaco Menezes MD personally performed the services described in this documentation    as scribed in my presence.:             "

## 2025-07-10 ENCOUNTER — OFFICE VISIT (OUTPATIENT)
Age: 75
End: 2025-07-10
Payer: MEDICARE

## 2025-07-10 VITALS
OXYGEN SATURATION: 96 % | SYSTOLIC BLOOD PRESSURE: 132 MMHG | BODY MASS INDEX: 46.98 KG/M2 | TEMPERATURE: 98 F | HEART RATE: 68 BPM | HEIGHT: 65 IN | DIASTOLIC BLOOD PRESSURE: 78 MMHG | WEIGHT: 282 LBS

## 2025-07-10 DIAGNOSIS — D22.9 NEVUS: ICD-10-CM

## 2025-07-10 DIAGNOSIS — L82.1 SEBORRHEIC KERATOSIS: ICD-10-CM

## 2025-07-10 DIAGNOSIS — D18.01 CHERRY ANGIOMA: ICD-10-CM

## 2025-07-10 DIAGNOSIS — Z13.89 SCREENING FOR SKIN CONDITION: Primary | ICD-10-CM

## 2025-07-10 PROCEDURE — 99213 OFFICE O/P EST LOW 20 MIN: CPT | Performed by: STUDENT IN AN ORGANIZED HEALTH CARE EDUCATION/TRAINING PROGRAM

## 2025-07-10 NOTE — PROGRESS NOTES
"[facility]at[facility] Note     Patient Name: Bret Urbina  Encounter Date: July 10,2025       Have you been cared for by a [facility] Dermatologist in the last 3 years and, if so, which description applies to you? Yes. I have been here within the last 3 years, and my medical history has NOT changed since that time. I am not of child-bearing potential.     REVIEW OF SYSTEMS:  Have you recently had or currently have any of the following? No changes in my recent health.   PAST MEDICAL HISTORY:  Have you personally ever had or currently have any of the following?  If \"YES,\" then please provide more detail. No changes in my medical history.   HISTORY OF IMMUNOSUPPRESSION: Do you have a history of any of the following:  Systemic Immunosuppression such as Diabetes, Biologic or Immunotherapy, Chemotherapy, Organ Transplantation, Bone Marrow Transplantation or Prednisone?  No     Answering \"YES\" requires the addition of the dotphrase \"IMMUNOSUPPRESSED\" as the first diagnosis of the patient's visit.   FAMILY HISTORY:  Any \"first degree relatives\" (parent, brother, sister, or child) with the following?    No changes in my family's known health.   PATIENT EXPERIENCE:    Do you want the Dermatologist to perform a COMPLETE skin exam today including a clinical examination under the \"bra and underwear\" areas?  Yes  If necessary, do we have your permission to call and leave a detailed message on your Preferred Phone number that includes your specific medical information?  Yes      Allergies[1] Current Medications[2]              Whom besides the patient is providing clinical information about today's encounter?   NO ADDITIONAL HISTORIAN (patient alone provided history)    Physical Exam and Assessment/Plan by Diagnosis:      SEBORRHEIC KERATOSIS; NON-INFLAMED    Physical Exam:  Anatomic Location Affected:  scattered across trunk, extremities,  face  Morphological Description:  Flat and raised, waxy, smooth to warty textured, " "yellow to brownish-grey to dark brown to blackish, discrete, \"stuck-on\" appearing papules.  Pertinent Positives:  Pertinent Negatives:    Additional History of Present Condition:  Patient reports new bumps on the skin.  Denies itch, burn, pain, bleeding or ulceration.  Present constantly; nothing seems to make it worse or better.  No prior treatment.      Assessment and Plan:  Based on a thorough discussion of this condition and the management approach to it (including a comprehensive discussion of the known risks, side effects and potential benefits of treatment), the patient (family) agrees to implement the following specific plan:  Reassured benign        ANGIOMA (\"CHERRY ANGIOMA\")    Physical Exam:  Anatomic Location: scattered across sun exposed areas of the trunk and extremities   Morphologic Description: Firm red to reddish-blue discrete papules  Pertinent Positives:  Pertinent Negatives:    Additional History of Present Condition:  Present on exam.     Assessment and Plan:  Reassured benign      ACROCHORDON (\"SKIN TAG\")    Physical Exam:  Anatomic Location Affected:  left neck   Morphological Description:  skin colored papule   Pertinent Positives:  Pertinent Negatives:    Additional History of Present Condition:  present on exam. Patient asking about removal.     Assessment and Plan:  Based on a thorough discussion of this condition and the management approach to it (including a comprehensive discussion of the known risks, side effects and potential benefits of treatment), the patient (family) agrees to implement the following specific plan:  Discussed cosmetic removal   Reassured benign            Scribe Attestation    I,:  Jaye Barbosa MA am acting as a scribe while in the presence of the attending physician.:       I,:  Rajiv Ruiz DO personally performed the services described in this documentation    as scribed in my presence.:                [1]  Allergies  Allergen Reactions   • Erythromycin " Vomiting   • Latex Itching   • Zinc Acetate Vomiting   • Dog Epithelium Sneezing   • Dust Mite Extract Sneezing   • Pollen Extract Sneezing   • Wound Dressing Adhesive Rash   [2]    Current Outpatient Medications:   •  amitriptyline (ELAVIL) 10 mg tablet, Take 10 mg by mouth daily at bedtime, Disp: , Rfl:   •  amLODIPine (NORVASC) 10 mg tablet, Take 10 mg by mouth in the morning., Disp: , Rfl:   •  amLODIPine (NORVASC) 10 mg tablet, Take 10 mg by mouth in the morning., Disp: , Rfl:   •  desoximetasone (TOPICORT) 0.25 % cream, Apply topically, Disp: , Rfl:   •  enalapril (VASOTEC) 10 mg tablet, Take 10 mg by mouth in the morning and 10 mg in the evening., Disp: , Rfl:   •  enalapril (VASOTEC) 10 mg tablet, Take 1 tablet by mouth in the morning and 1 tablet in the evening., Disp: , Rfl:   •  Fenofibrate 150 MG CAPS, Take by mouth, Disp: , Rfl:   •  furosemide (LASIX) 40 mg tablet, Take 40 mg by mouth in the morning and 40 mg in the evening., Disp: , Rfl:   •  furosemide (LASIX) 40 mg tablet, Take 40 mg by mouth in the morning and 40 mg in the evening., Disp: , Rfl:   •  glipiZIDE (GLUCOTROL) 10 mg tablet, Take by mouth, Disp: , Rfl:   •  hydrochlorothiazide (HYDRODIURIL) 12.5 mg tablet, Take 12.5 mg by mouth in the morning., Disp: , Rfl:   •  ketoconazole (NIZORAL) 2 % cream, Apply topically in the morning., Disp: , Rfl:   •  magnesium oxide (MAG-OX) 400 mg tablet, Take 1 tablet by mouth in the morning., Disp: , Rfl:   •  metFORMIN (GLUCOPHAGE) 1000 MG tablet, Take 1,000 mg by mouth in the morning and 1,000 mg in the evening. Take with meals., Disp: , Rfl:   •  metFORMIN (GLUCOPHAGE) 1000 MG tablet, Take by mouth, Disp: , Rfl:   •  metoprolol succinate (TOPROL-XL) 25 mg 24 hr tablet, Take 25 mg by mouth in the morning., Disp: , Rfl:   •  metoprolol succinate (TOPROL-XL) 50 mg 24 hr tablet, Take 50 mg by mouth in the morning., Disp: , Rfl:   •  metoprolol tartrate (LOPRESSOR) 25 mg tablet, Take by mouth, Disp: , Rfl:    •  nystatin (MYCOSTATIN) cream, Apply topically as needed in the morning and as needed in the evening., Disp: , Rfl:   •  pantoprazole (PROTONIX) 40 mg tablet, Take 40 mg by mouth in the morning., Disp: , Rfl:   •  pravastatin (PRAVACHOL) 20 mg tablet, Take 20 mg by mouth in the morning., Disp: , Rfl:   •  triamcinolone (KENALOG) 0.5 % cream, Apply 1 application. topically in the morning and 1 application. in the evening. To affected area., Disp: , Rfl:   •  warfarin (COUMADIN) 5 mg tablet, Take by mouth in the evening., Disp: , Rfl:   •  amitriptyline (ELAVIL) 10 mg tablet, Take by mouth (Patient not taking: Reported on 7/10/2025), Disp: , Rfl:   •  potassium chloride (K-DUR,KLOR-CON) 20 mEq tablet, Take 20 mEq by mouth daily (Patient not taking: Reported on 7/10/2025), Disp: , Rfl:   •  potassium chloride (KLOR-CON) 20 mEq packet, Take by mouth (Patient not taking: Reported on 12/6/2022), Disp: , Rfl:   •  pravastatin (PRAVACHOL) 20 mg tablet, Take by mouth (Patient not taking: Reported on 7/10/2025), Disp: , Rfl:   •  triamcinolone (KENALOG) 0.1 % ointment, APPLY TO AFFECTED AREA TWICE A DAY ARMS ** MD STATED OINTMENT ONLY (Patient not taking: Reported on 7/10/2025), Disp: 30 g, Rfl: 0